# Patient Record
(demographics unavailable — no encounter records)

---

## 2024-10-15 NOTE — PHYSICAL EXAM
[General Appearance - Alert] : alert [General Appearance - In No Acute Distress] : in no acute distress [General Appearance - Well Nourished] : well nourished [General Appearance - Well Developed] : well developed [General Appearance - Well-Appearing] : healthy appearing [PERRL With Normal Accommodation] : pupils were equal in size, round, and reactive to light [Sclera] : the sclera and conjunctiva were normal [Extraocular Movements] : extraocular movements were intact [Outer Ear] : the ears and nose were normal in appearance [Oropharynx] : the oropharynx was normal [Neck Appearance] : the appearance of the neck was normal [Neck Cervical Mass (___cm)] : no neck mass was observed [Thyroid Diffuse Enlargement] : the thyroid was not enlarged [Jugular Venous Distention Increased] : there was no jugular-venous distention [Lungs Percussion] : the lungs were normal to percussion [Heart Rate And Rhythm] : heart rate was normal and rhythm regular [Edema] : there was no peripheral edema [Abdomen Soft] : soft [Abdomen Tenderness] : non-tender [Abdomen Mass (___ Cm)] : no abdominal mass palpated [Cervical Lymph Nodes Enlarged Posterior Bilaterally] : posterior cervical [Cervical Lymph Nodes Enlarged Anterior Bilaterally] : anterior cervical [Supraclavicular Lymph Nodes Enlarged Bilaterally] : supraclavicular [Axillary Lymph Nodes Enlarged Bilaterally] : axillary [No CVA Tenderness] : no ~M costovertebral angle tenderness [No Spinal Tenderness] : no spinal tenderness [Skin Color & Pigmentation] : normal skin color and pigmentation [Skin Turgor] : normal skin turgor [] : no rash [Sensation] : the sensory exam was normal to light touch and pinprick [Cranial Nerves] : cranial nerves 2-12 were intact [Motor Exam] : the motor exam was normal [No Focal Deficits] : no focal deficits [Oriented To Time, Place, And Person] : oriented to person, place, and time [Impaired Insight] : insight and judgment were intact [Affect] : the affect was normal [Mood] : the mood was normal

## 2025-01-24 NOTE — PHYSICAL EXAM
[de-identified] : Examination of the [left] cubital tunnel reveals discomfort with compression with associated numbness and tingling at the fingertips. There is a positive tinel. Exam [left] wrist + Durkan's with + N/T. There is + tinel. Patient is able to delineate numbness to median-sided digits volarly. [No obvious thenar atrophy] [de-identified] : [4] views of [bilateral hands and wrists] were reviewed today in my office and were seen by me and discussed with the patient.  These negative

## 2025-01-24 NOTE — HISTORY OF PRESENT ILLNESS
[FreeTextEntry1] : Sonal is a pleasant 46-year-old male presenting today with a longstanding history of left elbow wrist and hand discomfort.  Patient describes burning and numbness and tingling.  He describes waking up at night and shaking hand.

## 2025-01-24 NOTE — ASSESSMENT
[FreeTextEntry1] : ASSESSMENT: The patient comes in today with chronic exacerbated complaints of left upper extremity discomfort.  We have discussed particularly left elbow pain and left wrist pain we have discussed cubital and carpal tunnel disease.  We have discussed bracing activity modification extension modalities.  At this point the patient does elect for injections.  They should help in a therapeutic and diagnostic manner.  We have discussed efficacy of carpal tunnel versus cubital tunnel injections.   The patient was adequately and thoroughly informed of my assessment of their current condition(s).  - This may diminish bodily function for the extremity. We discussed prognosis, tx modalities including operative and nonoperative options for the above diagnostic assessment. As always, 2nd opinion is always provided as an option.  When accessible, I was able to review other physicians note(s) including reviewing other tests, imaging results as well as personally view these results for my own interpretation.   Injection:   The risks and benefits of a steroid injection were discussed in detail. The risks include but are not limited to: pain, infection, swelling, flare response, bleeding, subcutaneous fat atrophy, skin depigmentation and/or elevation of blood sugar. The risk of incomplete resolution of symptoms, recurrence and additional intervention was reviewed and considered by the patient. The patient agreed to proceed and under a sterile prep, I injected 1 unit 6mg into 1 cc of a combination of Celestone and Lidocaine into the left carpal tunnel, left cubital tunnel. The patient tolerated the injection well.  The patient was adequately and thoroughly informed of my assessment of their current condition(s).  DISCUSSION: 1.  Injections as above.  Bracing activity modification follow-up 6 weeks 2. [x] 3. [x]

## 2025-03-02 NOTE — CONSULT LETTER
[Dear  ___] : Dear  [unfilled], [Consult Letter:] : I had the pleasure of evaluating your patient, [unfilled]. [Please see my note below.] : Please see my note below. [Consult Closing:] : Thank you very much for allowing me to participate in the care of this patient.  If you have any questions, please do not hesitate to contact me. [Sincerely,] : Sincerely, [FreeTextEntry3] : Myron Myron I. Kleiner, M.D., FACR   Chief, Division of Rheumatology  Department of Medicine   [facility]   Chief, Division of Rheumatology  Department of Medicine   [facility]

## 2025-03-02 NOTE — PHYSICAL EXAM
[General Appearance - Alert] : alert [General Appearance - In No Acute Distress] : in no acute distress [General Appearance - Well Nourished] : well nourished [General Appearance - Well Developed] : well developed [General Appearance - Well-Appearing] : healthy appearing [Sclera] : the sclera and conjunctiva were normal [PERRL With Normal Accommodation] : pupils were equal in size, round, and reactive to light [Extraocular Movements] : extraocular movements were intact [Outer Ear] : the ears and nose were normal in appearance [Oropharynx] : the oropharynx was normal [Neck Appearance] : the appearance of the neck was normal [Neck Cervical Mass (___cm)] : no neck mass was observed [Jugular Venous Distention Increased] : there was no jugular-venous distention [Thyroid Diffuse Enlargement] : the thyroid was not enlarged [Lungs Percussion] : the lungs were normal to percussion [Heart Rate And Rhythm] : heart rate was normal and rhythm regular [Edema] : there was no peripheral edema [Abdomen Soft] : soft [Abdomen Tenderness] : non-tender [Abdomen Mass (___ Cm)] : no abdominal mass palpated [Cervical Lymph Nodes Enlarged Posterior Bilaterally] : posterior cervical [Cervical Lymph Nodes Enlarged Anterior Bilaterally] : anterior cervical [Supraclavicular Lymph Nodes Enlarged Bilaterally] : supraclavicular [Axillary Lymph Nodes Enlarged Bilaterally] : axillary [No CVA Tenderness] : no ~M costovertebral angle tenderness [No Spinal Tenderness] : no spinal tenderness [Skin Color & Pigmentation] : normal skin color and pigmentation [Skin Turgor] : normal skin turgor [] : no rash [Cranial Nerves] : cranial nerves 2-12 were intact [Sensation] : the sensory exam was normal to light touch and pinprick [Motor Exam] : the motor exam was normal [No Focal Deficits] : no focal deficits [Oriented To Time, Place, And Person] : oriented to person, place, and time [Impaired Insight] : insight and judgment were intact [Affect] : the affect was normal [Mood] : the mood was normal [FreeTextEntry1] :  Strength-5/5

## 2025-03-02 NOTE — HISTORY OF PRESENT ILLNESS
[FreeTextEntry1] : LASHONDA GIL is a 46 year old man who presents for follow up for further evaluation of joint symptoms and  rheumatic diseases including osteoarthritis, Sjogren Syndrome, left carpal tunnel syndrome  Patient feels fairly well. Occasional recent pain b/l PIPs. Occasional recent neck pain without radiation. Some recent dry eyes but denies recent dry mouth, using artificial tears and oral hydration with adequate relief, but denies using Biotene mouthwash.  The patient continues vitamin D supplements. Patient denies rash or side effects with current medications.

## 2025-03-02 NOTE — PHYSICAL EXAM
[General Appearance - Alert] : alert [General Appearance - In No Acute Distress] : in no acute distress [General Appearance - Well Nourished] : well nourished [General Appearance - Well Developed] : well developed [General Appearance - Well-Appearing] : healthy appearing [Sclera] : the sclera and conjunctiva were normal [PERRL With Normal Accommodation] : pupils were equal in size, round, and reactive to light [Extraocular Movements] : extraocular movements were intact [Outer Ear] : the ears and nose were normal in appearance [Oropharynx] : the oropharynx was normal [Neck Appearance] : the appearance of the neck was normal [Neck Cervical Mass (___cm)] : no neck mass was observed [Jugular Venous Distention Increased] : there was no jugular-venous distention [Thyroid Diffuse Enlargement] : the thyroid was not enlarged [Lungs Percussion] : the lungs were normal to percussion [Heart Rate And Rhythm] : heart rate was normal and rhythm regular [Edema] : there was no peripheral edema [Abdomen Soft] : soft [Abdomen Tenderness] : non-tender [Abdomen Mass (___ Cm)] : no abdominal mass palpated [Cervical Lymph Nodes Enlarged Posterior Bilaterally] : posterior cervical [Cervical Lymph Nodes Enlarged Anterior Bilaterally] : anterior cervical [Axillary Lymph Nodes Enlarged Bilaterally] : axillary [Supraclavicular Lymph Nodes Enlarged Bilaterally] : supraclavicular [No CVA Tenderness] : no ~M costovertebral angle tenderness [No Spinal Tenderness] : no spinal tenderness [Skin Color & Pigmentation] : normal skin color and pigmentation [Skin Turgor] : normal skin turgor [] : no rash [Cranial Nerves] : cranial nerves 2-12 were intact [Motor Exam] : the motor exam was normal [Sensation] : the sensory exam was normal to light touch and pinprick [No Focal Deficits] : no focal deficits [Oriented To Time, Place, And Person] : oriented to person, place, and time [Impaired Insight] : insight and judgment were intact [Affect] : the affect was normal [Mood] : the mood was normal [FreeTextEntry1] :  Strength-5/5

## 2025-03-02 NOTE — ADDENDUM
[FreeTextEntry1] :  I, Major Corrales, acted solely as a scribe for Dr. Myron I. Kleiner, MD. on 02/28/2025. I personally performed the services described in the documentation, reviewed the documentation recorded by the scribe in my presence, and it accurately and completely records my words and actions.

## 2025-03-02 NOTE — CONSULT LETTER
[Dear  ___] : Dear  [unfilled], [Consult Letter:] : I had the pleasure of evaluating your patient, [unfilled]. [Please see my note below.] : Please see my note below. [Consult Closing:] : Thank you very much for allowing me to participate in the care of this patient.  If you have any questions, please do not hesitate to contact me. [Sincerely,] : Sincerely, [FreeTextEntry3] : Myron Myron I. Kleiner, M.D., FACR   Chief, Division of Rheumatology  Department of Medicine   [facility]   Chief, Division of Rheumatology  Department of Medicine   [facility]

## 2025-03-02 NOTE — ASSESSMENT
[FreeTextEntry1] :  Impression: LASHONDA GIL is a 46 year old man who presents for follow up for further evaluation of joint symptoms and rheumatic diseases including osteoarthritis, Sjogren Syndrome, left carpal tunnel syndrome  Patient feels fairly well. Occasional recent pain b/l PIPs secondary to osteoarthritis. Occasional recent neck pain without radiation secondary to osteoarthritis. Some recent dry eyes but denies recent dry mouth, however on exam pt has dry eyes, tongue dry and mildly enlarged bilateral parotid glands nontender Secondary to Sjogren Syndrome -- using artificial tears and oral hydration with adequate relief, but denies using Biotene mouthwash.  The patient continues vitamin D supplements. Patient denies rash or side effects with current medications.   Plan: I reviewed patients chart and previous records with extensive discussion  Laboratory tests ordered  see list below  with coordination of care  Diagnosis and prognosis discussed Continue current medications (other than those changed below) Discontinue Meloxicam  Etodolac  mg b.i.d. end of breakfast and supper (Possible side effects explained including cardiovascular risk/MI/CVA) Change timing Prophylactic aspirin 81 mg q.d. (end of lunch)  (Possible side effects explained)  Bilateral wrist splints h.s.--extensive discussion Artificial tears one drop each eye q.i.d. and p.r.n.(Possible side effects explained)  Biotene mouthwash/spray q.i.d. and p.r.n.(Possible side effects explained)  Oral Hydration Patient declines oral medication for dryness  Return visit 4 months  All questions and concerns were addressed

## 2025-07-27 NOTE — CONSULT LETTER
[Dear  ___] : Dear  [unfilled], [Consult Letter:] : I had the pleasure of evaluating your patient, [unfilled]. [Please see my note below.] : Please see my note below. [Consult Closing:] : Thank you very much for allowing me to participate in the care of this patient.  If you have any questions, please do not hesitate to contact me. [Sincerely,] : Sincerely, [FreeTextEntry3] : Myron Myron I. Kleiner, M.D., FACR   Chief, Division of Rheumatology  Department of Medicine   [facility]   Chief, Division of Rheumatology  Department of Medicine   [facility]

## 2025-07-27 NOTE — HISTORY OF PRESENT ILLNESS
[FreeTextEntry1] : LASHONDA GIL is a 46 year old man who presents for follow up for further evaluation of joint symptoms and  rheumatic diseases including osteoarthritis, Sjogren Syndrome, left carpal tunnel syndrome  Note: Patient last seen February 2025.   Patient feels fairly well. Denies recent joint pain.  Some recent dry eyes but denies recent dry mouth, using artificial tears and oral hydration with adequate relief, but denies using Biotene mouthwash. Rare paresthesia left fingers. Occasional wrist splints h.s. On his own, pt discontinued Etodolac secondary to feeling well. The patient continues vitamin D supplements. Patient denies rash or side effects with current medications.

## 2025-07-27 NOTE — ASSESSMENT
[FreeTextEntry1] :  Impression: LASHONDA GIL is a 46 year old man who presents for follow up for further evaluation of joint symptoms and rheumatic diseases including osteoarthritis, Sjogren Syndrome, left carpal tunnel syndrome  Note: Patient last seen February 2025.  Patient feels fairly well. Denies recent joint pain, his osteoarthritis is under good control.  Some recent dry eyes but denies recent dry mouth. On exam, pt has eyes dry, tongue slightly moist, and and mildly enlarged bilateral parotid glands nontender secondary to Sjogren's Syndrome. Using artificial tears and oral hydration with adequate relief, but denies using Biotene mouthwash. Rare paresthesia left fingers secondary to left carpal tunnel syndrome. Occasional wrist splints h.s. On his own, pt discontinued Etodolac secondary to feeling well.  Recent lab tests revealed no significant results or changes, with extensive discussion. The patient continues vitamin D supplements. Patient denies rash or side effects with current medications. Patient is content with current medication regimen.  Plan: I reviewed patients chart and previous records with extensive discussion Recent laboratory tests results reveal no significant changes or results, with extensive discussion. Laboratory tests ordered  see list below  with coordination of care  Diagnosis and prognosis discussed Continue current medications (other than those changed below) Stay off Etodolac ER---Extensive discussion. Patient declines any changes or additions to medication regimen. Bilateral wrist splints h.s.--extensive discussion Artificial tears one drop each eye q.i.d. and p.r.n.(Possible side effects explained)  Biotene mouthwash/spray q.i.d. and p.r.n.(Possible side effects explained)  Oral Hydration Patient declines oral medication for dryness  Return visit 4 months  All questions and concerns were addressed

## 2025-07-27 NOTE — CONSULT LETTER
[Dear  ___] : Dear  [unfilled], [Consult Letter:] : I had the pleasure of evaluating your patient, [unfilled]. [Please see my note below.] : Please see my note below. [Consult Closing:] : Thank you very much for allowing me to participate in the care of this patient.  If you have any questions, please do not hesitate to contact me. [Sincerely,] : Sincerely, [FreeTextEntry3] : Myron Myron I. Kleiner, M.D., FACR   Chief, Division of Rheumatology  Department of Medicine   [facility]   Chief, Division of Rheumatology  Department of Medicine   [facility]

## 2025-07-27 NOTE — ADDENDUM
[FreeTextEntry1] :  I, Vilma Phillips, acted solely as a scribe for Dr. Myron I. Kleiner, MD. on 07/24/2025. I personally performed the services described in the documentation, reviewed the documentation recorded by the scribe in my presence, and it accurately and completely records my words and actions.